# Patient Record
Sex: FEMALE | URBAN - METROPOLITAN AREA
[De-identification: names, ages, dates, MRNs, and addresses within clinical notes are randomized per-mention and may not be internally consistent; named-entity substitution may affect disease eponyms.]

---

## 2024-05-20 ENCOUNTER — APPOINTMENT (RX ONLY)
Dept: URBAN - METROPOLITAN AREA CLINIC 101 | Facility: CLINIC | Age: 59
Setting detail: DERMATOLOGY
End: 2024-05-20

## 2024-05-20 VITALS — HEIGHT: 63 IN | WEIGHT: 135 LBS

## 2024-05-20 DIAGNOSIS — Z41.1 ENCOUNTER FOR COSMETIC SURGERY: ICD-10-CM

## 2024-05-20 PROCEDURE — ? ADDITIONAL NOTES

## 2024-05-20 PROCEDURE — ? DVT RISK ASSESSMENT

## 2024-05-20 NOTE — PROCEDURE: ADDITIONAL NOTES
Additional Notes: Patient's concerns include: \\n-  Desire for improved abdominal contour \\n-  Desire for breast reduction (currently G-cup and desires to be B-cup)\\n\\n\\nFactors altering surgical decisions (hx/exam findings): \\n- Macromastia and breast ptosis (grade 3)\\n- Breast asymmetry with right breast larger by 50-75g\\n-  Moderate abdominal skin laxity and adiposity with palpable rectus diastasis \\n- Non-smoker\\n- Last mammogram about 2 years ago\\n- Factor 5 Leiden \\n- Hx of PKU \\n- Hx of malignant hyperthermia\\n\\n\\nProposed intervention(s):\\n- Recommend breast reduction (removal of 300-400g from right and 250-350g from left) and standard abdominoplasty with rectus diastasis repair to be done under TIVA anesthesia. Patient will likely require a short course of Lovenox postop given her increased risk for clotting from Factor 5 Leiden. Discussed limitations of surgery including inability to remove all stretch marks or correct all degrees of abdominal laxity or fullness, which the patient understands. Discussed that final breast volume removed is limited as enough tissue needs to be left behind to keep the NAC perfused. Patient needs an updated mammogram prior to surgery and will also need medical clearance.\\n- Discussed the standard abdominoplasty with rectus diastasis repair in detail. Discussed limitations of surgery including inability to remove all stretch marks or correct all degrees of abdominal laxity or fullness, which the patient understands. Discussed incision patterns (standard abdominoplasty incision with possible small vertical incision if needed) and postop care including activity restrictions for 6 weeks, early ambulation required, drain care, need to remain flexed at waist/beach chair position for 7 days after surgery before gradually straightening up, and use of postop compression x6 weeks\\n- Discussed breast reduction surgery including incision patterns and postop care with 6 weeks of activity restrictions and use of surgical bra. Discussed risks of reduction surgery including infection, bleeding, scar, damage to nearby structures, need for future surgery, hematoma, seroma, asymmetry, loss of NAC, sensory change of NAC, skin necrosis, pain, delayed wound healing, inability to breast feed, no guarantee of cup size, recurrent ptosis, fat necrosis, change in breast size, and poor cosmetic result.\\n- Discussed risks of surgery including infection, bleeding, scar, need for future surgery, damage to nearby structures, contour irregularities, seroma, hematoma, skin necrosis, postop deformity- dog ear, pain, DVT/PE, wound care, asymmetry, numbness/sensory changes, edema, loss of umbilicus, lymphedema, poor cosmetic result, abdominal bulging, inadequate skin retraction, persistent deformity, implant failure, loss of NAC, sensory change of NAC, skin necrosis, delayed wound healing, inability to breast feed, recurrent ptosis, no guarantee of cup size, pain, poor cosmetic result, numbness/sensory changes, edema, lymphedema, loss of umbilicus, abdominal bulging, inadequate skin retraction, postop deformity- dog ear, pain, and DVT/PE.\\n- VTE prophylaxis will include use of intermittent pneumatic leg compression devices with early ambulation as well as possible use of chemoprophylaxis (Lovenox) postop\\n- Discussed postop pain control including optional use of Exparel, which is a long acting local anesthetic injected at the time of surgery\\n- The patient was provided with pricing info for the procedures mentioned\\n- Patient needs an updated mammogram prior to surgery and will also need medical clearance.\\n\\n\\n\\n\\n\\n\\nPre-op Counseling:\\n- Aesthetic Abdominal Deformity Care: Abdominal cosmetic dissatisfaction may be due to excess skin, stretch marks, bulging, fat excess, muscle weakness, and other complaints. Abdominoplasty, liposuction and other body contouring techniques are performed to help correct these issues. Surgery is commonly performed on an outpatient basis, although overnight hospitalization may be indicated in some patients, particularly those undergoing large body contouring operations. Abdominal aesthetic deformities may improve somewhat with diet control, exercise, rest, and proper skin care, including avoidance of excess sun and abstinence from nicotine. Specific preoperative and postoperative instructions will be provided for surgery.\\n- Expectations: Abdominal aesthetic concerns may be the result of obesity or overweight, pregnancy, genetic factors, sun damage, prior surgery, hernias, and other factors. Aesthetic surgery for abdominal concerns is generally not performed for the purposes of weight loss. Rather, overweight patients are advised to lose weight in a controlled, supervised manner until a maintainable plateau weight is achieved before undergoing abdominal body contouring operations, in order to optimize results and reduce surgical risks. Abdominoplasty may not correct wrinkling, roundness, or all degrees of laxity or fullness on the abdomen. Liposuction can also performed for contouring purposes. Skin retraction may not be complete with liposuction, and excess skin may require surgical removal for full correction. Use of garments after surgery is advised and instructions will be provided. Risks, benefits, expectations and alternatives to abdominoplasty and liposuction have been explained in detail, including, but not limited to, the risks of infection, bleeding, injury to nerves or abdominal organs, hernia, abdominal bulging, contour irregularities, inadequate skin retraction, persistent deformity, seromas, deep venous thrombosis, pulmonary embolism, fat embolism, scarring, delayed healing, loss of the belly button, and other risks. No guarantee or warranty regarding cosmetic outcome or longevity of results was given or implied.\\n-Aesthetic Breast Deformity Care: Breast cosmetic dissatisfaction may be due to volume inadequacy, volume excess, breast descent (sagging), excess skin, nipple distortion, scarring, asymmetry, and other complaints. Breast aesthetics are the result of complex interplay between the skin envelope, the nipple position, the volume and projection of the breasts, the chest wall anatomy, age, hormonal status, and other factors. Clear articulation of patient aesthetic goals is important to help the surgeon meet patient expectations. Photos of desired results may be informative but can not be fully relied upon as a guide for surgery. No guarantee has been made or implied regarding matching final results to photographic examples or specific cup sizes. Cosmetic breast surgery is commonly performed on an outpatient basis, although overnight hospitalization may be indicated in some patients, particularly those undergoing large body contouring operations. Breast enlargement may improve somewhat with diet control, exercise, and proper care, including adequate supportive breast garments, avoidance of excess sun to the breast skin, and abstinence from nicotine. Specific preoperative and postoperative instructions will be provided for surgery. Breast implants are commonly used for enhancement of the breast. Risks associated with implant use are enumerated in the specific counseling blocks below. Fat transfer to the breast area is commonly done by combining liposuction of problem areas with injection of the suctioned fat into the breasts to fill contour defects, or to create a thompson, rounder look. Irregularity, asymmetry, and loss of volume in augmented areas may still occur, and aging changes in the breast will continue as the patient gets older. \\n-Expectations: Breast surgery is typically done under general anesthesia, although some patients may undergo surgery under sedation with local anesthesia. Healing typically takes anywhere between 3 to 6 weeks for incisions and several more weeks are often required for settling of the breasts. Scars may mature over the course of 6-12 months.
Render Risk Assessment In Note?: no